# Patient Record
Sex: MALE | Race: WHITE | Employment: FULL TIME | URBAN - METROPOLITAN AREA
[De-identification: names, ages, dates, MRNs, and addresses within clinical notes are randomized per-mention and may not be internally consistent; named-entity substitution may affect disease eponyms.]

---

## 2020-10-06 ENCOUNTER — APPOINTMENT (OUTPATIENT)
Dept: GENERAL RADIOLOGY | Age: 40
End: 2020-10-06
Attending: EMERGENCY MEDICINE
Payer: MEDICAID

## 2020-10-06 ENCOUNTER — HOSPITAL ENCOUNTER (EMERGENCY)
Age: 40
Discharge: HOME | End: 2020-10-06
Attending: EMERGENCY MEDICINE
Payer: MEDICAID

## 2020-10-06 VITALS
OXYGEN SATURATION: 98 % | SYSTOLIC BLOOD PRESSURE: 135 MMHG | HEART RATE: 71 BPM | TEMPERATURE: 98.9 F | DIASTOLIC BLOOD PRESSURE: 82 MMHG | RESPIRATION RATE: 18 BRPM | WEIGHT: 199.08 LBS

## 2020-10-06 DIAGNOSIS — R07.9 CHEST PAIN, UNSPECIFIED TYPE: Primary | ICD-10-CM

## 2020-10-06 DIAGNOSIS — H17.9 CORNEAL OPACITY OF LEFT EYE: ICD-10-CM

## 2020-10-06 LAB
ALBUMIN SERPL-MCNC: 4.2 G/DL (ref 3.5–5)
ALBUMIN/GLOB SERPL: 1.1 {RATIO} (ref 1.1–2.2)
ALP SERPL-CCNC: 72 U/L (ref 45–117)
ALT SERPL-CCNC: 41 U/L (ref 12–78)
ANION GAP SERPL CALC-SCNC: 9 MMOL/L (ref 5–15)
AST SERPL-CCNC: 26 U/L (ref 15–37)
ATRIAL RATE: 81 BPM
BASOPHILS # BLD: 0 K/UL (ref 0–0.1)
BASOPHILS NFR BLD: 0 % (ref 0–1)
BILIRUB SERPL-MCNC: 0.8 MG/DL (ref 0.2–1)
BUN SERPL-MCNC: 19 MG/DL (ref 6–20)
BUN/CREAT SERPL: 20 (ref 12–20)
CALCIUM SERPL-MCNC: 9.1 MG/DL (ref 8.5–10.1)
CALCULATED P AXIS, ECG09: 63 DEGREES
CALCULATED R AXIS, ECG10: 66 DEGREES
CALCULATED T AXIS, ECG11: 18 DEGREES
CHLORIDE SERPL-SCNC: 101 MMOL/L (ref 97–108)
CO2 SERPL-SCNC: 27 MMOL/L (ref 21–32)
COMMENT, HOLDF: NORMAL
CREAT SERPL-MCNC: 0.97 MG/DL (ref 0.7–1.3)
DIAGNOSIS, 93000: NORMAL
DIFFERENTIAL METHOD BLD: NORMAL
EOSINOPHIL # BLD: 0 K/UL (ref 0–0.4)
EOSINOPHIL NFR BLD: 1 % (ref 0–7)
ERYTHROCYTE [DISTWIDTH] IN BLOOD BY AUTOMATED COUNT: 12.2 % (ref 11.5–14.5)
GLOBULIN SER CALC-MCNC: 4 G/DL (ref 2–4)
GLUCOSE SERPL-MCNC: 98 MG/DL (ref 65–100)
HCT VFR BLD AUTO: 44.3 % (ref 36.6–50.3)
HGB BLD-MCNC: 15 G/DL (ref 12.1–17)
IMM GRANULOCYTES # BLD AUTO: 0 K/UL (ref 0–0.04)
IMM GRANULOCYTES NFR BLD AUTO: 0 % (ref 0–0.5)
LYMPHOCYTES # BLD: 1.8 K/UL (ref 0.8–3.5)
LYMPHOCYTES NFR BLD: 34 % (ref 12–49)
MCH RBC QN AUTO: 29.4 PG (ref 26–34)
MCHC RBC AUTO-ENTMCNC: 33.9 G/DL (ref 30–36.5)
MCV RBC AUTO: 86.7 FL (ref 80–99)
MONOCYTES # BLD: 0.5 K/UL (ref 0–1)
MONOCYTES NFR BLD: 9 % (ref 5–13)
NEUTS SEG # BLD: 3 K/UL (ref 1.8–8)
NEUTS SEG NFR BLD: 56 % (ref 32–75)
NRBC # BLD: 0 K/UL (ref 0–0.01)
NRBC BLD-RTO: 0 PER 100 WBC
P-R INTERVAL, ECG05: 156 MS
PLATELET # BLD AUTO: 326 K/UL (ref 150–400)
PMV BLD AUTO: 10.4 FL (ref 8.9–12.9)
POTASSIUM SERPL-SCNC: 4 MMOL/L (ref 3.5–5.1)
PROT SERPL-MCNC: 8.2 G/DL (ref 6.4–8.2)
Q-T INTERVAL, ECG07: 352 MS
QRS DURATION, ECG06: 90 MS
QTC CALCULATION (BEZET), ECG08: 408 MS
RBC # BLD AUTO: 5.11 M/UL (ref 4.1–5.7)
SAMPLES BEING HELD,HOLD: NORMAL
SODIUM SERPL-SCNC: 137 MMOL/L (ref 136–145)
TROPONIN I SERPL-MCNC: <0.05 NG/ML
VENTRICULAR RATE, ECG03: 81 BPM
WBC # BLD AUTO: 5.4 K/UL (ref 4.1–11.1)

## 2020-10-06 PROCEDURE — 93005 ELECTROCARDIOGRAM TRACING: CPT

## 2020-10-06 PROCEDURE — 71046 X-RAY EXAM CHEST 2 VIEWS: CPT

## 2020-10-06 PROCEDURE — 99284 EMERGENCY DEPT VISIT MOD MDM: CPT

## 2020-10-06 PROCEDURE — 85025 COMPLETE CBC W/AUTO DIFF WBC: CPT

## 2020-10-06 PROCEDURE — 84484 ASSAY OF TROPONIN QUANT: CPT

## 2020-10-06 PROCEDURE — 36415 COLL VENOUS BLD VENIPUNCTURE: CPT

## 2020-10-06 PROCEDURE — 80053 COMPREHEN METABOLIC PANEL: CPT

## 2020-10-06 NOTE — ED TRIAGE NOTES
TRIAGE NOTE: Pt arrives for left chest pain since Saturday after lunch. Pain is intermittent, unable to clearly describe discomfort. Denies/ N/V/D.     Moved a heavy welding machine Saturday

## 2020-10-06 NOTE — ED PROVIDER NOTES
HPI 65-year-old male presents complaining of left-sided chest pain that had its onset on Saturday. Patient states that pain level is less than a 2 and comes intermittently. He denies any aggravating or alleviating factors. Patient has difficulty finding words to describe the discomfort. He also denies numbness or tingling nausea or vomiting, or diaphoresis. He states that he was having a conversation with his  who advised him to have this symptom evaluated. Patient states he probably would not have come in and had it not been for the 's recommendation. Patient reports he does not need anything for pain at this time. And just wants to know that he is okay. Is a nursing history that the patient did move a heavy advice a few days ago. History reviewed. No pertinent past medical history. History reviewed. No pertinent surgical history. History reviewed. No pertinent family history.     Social History     Socioeconomic History    Marital status:      Spouse name: Not on file    Number of children: Not on file    Years of education: Not on file    Highest education level: Not on file   Occupational History    Not on file   Social Needs    Financial resource strain: Not on file    Food insecurity     Worry: Not on file     Inability: Not on file    Transportation needs     Medical: Not on file     Non-medical: Not on file   Tobacco Use    Smoking status: Never Smoker   Substance and Sexual Activity    Alcohol use: Never     Frequency: Never    Drug use: Never    Sexual activity: Not on file   Lifestyle    Physical activity     Days per week: Not on file     Minutes per session: Not on file    Stress: Not on file   Relationships    Social connections     Talks on phone: Not on file     Gets together: Not on file     Attends Gnosticism service: Not on file     Active member of club or organization: Not on file     Attends meetings of clubs or organizations: Not on file Relationship status: Not on file    Intimate partner violence     Fear of current or ex partner: Not on file     Emotionally abused: Not on file     Physically abused: Not on file     Forced sexual activity: Not on file   Other Topics Concern    Not on file   Social History Narrative    Not on file         ALLERGIES: Patient has no known allergies. Review of Systems   Constitutional: Negative. HENT: Negative. Eyes:        Patient reports a history of a bullous lesion over his left cornea which has healed to the point of leaving a residual scar and a small defect in his vision out of his left eye. He is under the care of an ophthalmologist for this and reports spontaneous healing over the past several weeks. Respiratory: Negative. Cardiovascular: Positive for chest pain. Gastrointestinal: Negative. Endocrine: Negative. Genitourinary: Negative. Musculoskeletal: Negative. Skin: Negative. Allergic/Immunologic: Negative. Neurological: Negative. Hematological: Negative. Psychiatric/Behavioral: Negative. All other systems reviewed and are negative. Vitals:    10/06/20 1311 10/06/20 1315 10/06/20 1400   BP: (!) 137/95 138/85 135/82   Pulse: 85 82 71   Resp: 16 17 18   Temp: 99.6 °F (37.6 °C)  98.9 °F (37.2 °C)   SpO2: 97% 97% 98%   Weight: 90.3 kg (199 lb 1.2 oz)              Physical Exam  Vitals signs and nursing note reviewed. Constitutional:       General: He is not in acute distress. Appearance: He is well-developed. He is not ill-appearing. HENT:      Head: Normocephalic and atraumatic. Eyes:      General: Lids are normal. Gaze aligned appropriately. Extraocular Movements: Extraocular movements intact. Pupils: Pupils are equal, round, and reactive to light. Neck:      Musculoskeletal: Normal range of motion and neck supple. Thyroid: No thyromegaly. Cardiovascular:      Rate and Rhythm: Normal rate and regular rhythm.       Heart sounds: Heart sounds not distant. No murmur. Pulmonary:      Effort: Pulmonary effort is normal.      Breath sounds: Normal breath sounds. Chest:      Chest wall: No mass, deformity, tenderness, crepitus or edema. There is no dullness to percussion. Abdominal:      Palpations: Abdomen is soft. Musculoskeletal: Normal range of motion. Skin:     General: Skin is warm. Capillary Refill: Capillary refill takes less than 2 seconds. Neurological:      General: No focal deficit present. Mental Status: He is alert and oriented to person, place, and time. Psychiatric:         Mood and Affect: Mood normal.         Behavior: Behavior normal.          MDM  Number of Diagnoses or Management Options  Chest pain, unspecified type:   Corneal opacity of left eye: established and improving  57-year-old male with no past medical history for coronary artery disease no family history for coronary artery disease presents with 3 days of left-sided chest pain possibly associated with moving a heavy object. Patient clinical exam does not identify any acute findings and he does not have any reproducible tenderness to palpation. His EKG is showing a normal sinus rhythm with nonspecific T wave changes. CBC, CMP, and troponin were all within normal limits. Patient also had a chest x-ray that was read as normal.    With a heart score of 1 and a physical exam that remained normal, as well as the patient having no other issues during his stay in the emergency department -will be discharged to home to follow-up with his primary care physician. He was informed that he should return to the emergency department if worse or further problems. XR CHEST PA LAT   Final Result   IMPRESSION: No acute cardiopulmonary disease.            Labs Reviewed   SAMPLES BEING HELD   CBC WITH AUTOMATED DIFF   METABOLIC PANEL, COMPREHENSIVE   TROPONIN I         Procedures

## 2020-10-06 NOTE — DISCHARGE INSTRUCTIONS

## 2021-05-12 ENCOUNTER — HOSPITAL ENCOUNTER (EMERGENCY)
Age: 41
Discharge: HOME | End: 2021-05-12
Attending: EMERGENCY MEDICINE
Payer: MEDICAID

## 2021-05-12 VITALS
TEMPERATURE: 97.9 F | BODY MASS INDEX: 28.75 KG/M2 | SYSTOLIC BLOOD PRESSURE: 133 MMHG | HEIGHT: 70 IN | RESPIRATION RATE: 16 BRPM | OXYGEN SATURATION: 98 % | WEIGHT: 200.84 LBS | DIASTOLIC BLOOD PRESSURE: 91 MMHG | HEART RATE: 76 BPM

## 2021-05-12 DIAGNOSIS — S05.02XA ABRASION OF LEFT CORNEA, INITIAL ENCOUNTER: Primary | ICD-10-CM

## 2021-05-12 PROCEDURE — 99283 EMERGENCY DEPT VISIT LOW MDM: CPT

## 2021-05-12 PROCEDURE — 74011250637 HC RX REV CODE- 250/637: Performed by: EMERGENCY MEDICINE

## 2021-05-12 PROCEDURE — 74011000250 HC RX REV CODE- 250: Performed by: EMERGENCY MEDICINE

## 2021-05-12 RX ORDER — ERYTHROMYCIN 5 MG/G
OINTMENT OPHTHALMIC
Status: COMPLETED | OUTPATIENT
Start: 2021-05-12 | End: 2021-05-12

## 2021-05-12 RX ORDER — ERYTHROMYCIN 5 MG/G
1 OINTMENT OPHTHALMIC EVERY 6 HOURS
Qty: 1 TUBE | Refills: 0 | Status: SHIPPED | OUTPATIENT
Start: 2021-05-12

## 2021-05-12 RX ORDER — TETRACAINE HYDROCHLORIDE 5 MG/ML
1 SOLUTION OPHTHALMIC
Status: COMPLETED | OUTPATIENT
Start: 2021-05-12 | End: 2021-05-12

## 2021-05-12 RX ADMIN — FLUORESCEIN SODIUM 1 STRIP: 1 STRIP OPHTHALMIC at 21:35

## 2021-05-12 RX ADMIN — TETRACAINE HYDROCHLORIDE 1 DROP: 5 SOLUTION OPHTHALMIC at 21:35

## 2021-05-12 RX ADMIN — ERYTHROMYCIN: 5 OINTMENT OPHTHALMIC at 22:03

## 2021-05-13 NOTE — ED PROVIDER NOTES
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 The patient is a  and states that for the last several days he has had left eye irritation and pain. He does wear hard contacts. 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 He has not seen him in approximately 1 year. He denies any other symptoms. History reviewed. No pertinent past medical history. History reviewed. No pertinent surgical history. History reviewed. No pertinent family history.     Social History     Socioeconomic History    Marital status:      Spouse name: Not on file    Number of children: Not on file    Years of education: Not on file    Highest education level: Not on file   Occupational History    Not on file   Social Needs    Financial resource strain: Not on file    Food insecurity     Worry: Not on file     Inability: Not on file    Transportation needs     Medical: Not on file     Non-medical: Not on file   Tobacco Use    Smoking status: Never Smoker    Smokeless tobacco: Never Used   Substance and Sexual Activity    Alcohol use: Never     Frequency: Never    Drug use: Never    Sexual activity: Not on file   Lifestyle    Physical activity     Days per week: Not on file     Minutes per session: Not on file    Stress: Not on file   Relationships    Social connections     Talks on phone: Not on file     Gets together: Not on file     Attends Confucianism service: Not on file     Active member of club or organization: Not on file     Attends meetings of clubs or organizations: Not on file     Relationship status: Not on file    Intimate partner violence     Fear of current or ex partner: Not on file     Emotionally abused: Not on file     Physically abused: Not on file     Forced sexual activity: Not on file   Other Topics Concern    Not on file   Social History Narrative    Not on file ALLERGIES: Patient has no known allergies. Review of Systems   Constitutional: Negative for fever. HENT: Negative for rhinorrhea. Eyes: Positive for itching. Respiratory: Negative for cough. Cardiovascular: Negative for chest pain. Gastrointestinal: Negative for abdominal pain. Genitourinary: Negative for dysuria. Musculoskeletal: Negative for back pain. Skin: Negative for wound. Neurological: Negative for headaches. Psychiatric/Behavioral: Negative for confusion. Vitals:    05/12/21 2124   BP: (!) 133/91   Pulse: 76   Resp: 16   Temp: 97.9 °F (36.6 °C)   SpO2: 98%   Weight: 91.1 kg (200 lb 13.4 oz)   Height: 5' 10\" (1.778 m)            Physical Exam  Vitals signs and nursing note reviewed. Constitutional:       General: He is not in acute distress. Appearance: Normal appearance. He is not ill-appearing, toxic-appearing or diaphoretic. HENT:      Head: Normocephalic. Eyes:      Extraocular Movements: Extraocular movements intact. Pupils: Pupils are equal, round, and reactive to light. Comments: Chronic scarring to the left cornea. Fluorescein staining demonstrates uptake and a new corneal abrasion overlying the scar. Neck:      Musculoskeletal: Normal range of motion. Cardiovascular:      Rate and Rhythm: Normal rate. Pulses: Normal pulses. Heart sounds: Normal heart sounds. Pulmonary:      Effort: Pulmonary effort is normal. No respiratory distress. Breath sounds: Normal breath sounds. Abdominal:      General: There is no distension. Musculoskeletal: Normal range of motion. Skin:     General: Skin is warm and dry. Capillary Refill: Capillary refill takes less than 2 seconds. Neurological:      Mental Status: He is alert and oriented to person, place, and time.    Psychiatric:         Mood and Affect: Mood normal.          MDM  Number of Diagnoses or Management Options  Abrasion of left cornea, initial encounter  Diagnosis management comments: Presents with left eye irritation. 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 The lid was everted and there is no foreign body. Patient was placed on erythromycin ointment and advised to discard his current contacts. I did recommend he follow-up with his ophthalmologist tomorrow. Discussed my clinical impression(s), any labs and/or radiology results with the patient. I answered any questions and addressed any concerns. Discussed the importance of following up with their primary care physician and/or specialist(s). Discussed signs or symptoms that would warrant return back to the ER for further evaluation. The patient is agreeable with discharge.          Procedures

## 2021-05-13 NOTE — ED TRIAGE NOTES
Pt arrives ambulatory to ed with complaints of left eye pain since yesterday. Pt concerned for foreign body in eye. Pain started after patient welding. Pt states he was wearing protective eye ware. Wearscontact lenses.

## 2023-05-15 RX ORDER — ERYTHROMYCIN 5 MG/G
1 OINTMENT OPHTHALMIC EVERY 6 HOURS
COMMUNITY
Start: 2021-05-12